# Patient Record
Sex: FEMALE | URBAN - METROPOLITAN AREA
[De-identification: names, ages, dates, MRNs, and addresses within clinical notes are randomized per-mention and may not be internally consistent; named-entity substitution may affect disease eponyms.]

---

## 2021-09-17 ENCOUNTER — OFFICE VISIT (OUTPATIENT)
Dept: URBAN - METROPOLITAN AREA CLINIC 6 | Facility: CLINIC | Age: 76
End: 2021-09-17
Payer: MEDICARE

## 2021-09-17 DIAGNOSIS — H04.123 TEAR FILM INSUFFICIENCY OF BILATERAL LACRIMAL GLANDS: Primary | ICD-10-CM

## 2021-09-17 PROCEDURE — 68761 CLOSE TEAR DUCT OPENING: CPT | Performed by: OPTOMETRIST

## 2021-09-17 PROCEDURE — 92012 INTRM OPH EXAM EST PATIENT: CPT | Performed by: OPTOMETRIST

## 2021-09-17 ASSESSMENT — INTRAOCULAR PRESSURE
OS: 11
OD: 13

## 2021-09-17 NOTE — IMPRESSION/PLAN
Impression: Tear film insufficiency of bilateral lacrimal glands: H04.123. Plan: Emergency visit for Dry eyes - not improving with use of topical treatment - reviewed the r/b/a of punctal plugs - placed Odyssey/BVI parasol plugs both inferior eyelids. Advised to contact us with any problems or concerns. Size: S Lot: [id number]. Continue ATs OU and start warm compresses and lid hygiene.

## 2023-05-04 ENCOUNTER — OFFICE VISIT (OUTPATIENT)
Dept: URBAN - METROPOLITAN AREA CLINIC 6 | Facility: CLINIC | Age: 78
End: 2023-05-04
Payer: MEDICARE

## 2023-05-04 DIAGNOSIS — H00.12 CHALAZION RIGHT LOWER LID: Primary | ICD-10-CM

## 2023-05-04 PROCEDURE — 92012 INTRM OPH EXAM EST PATIENT: CPT

## 2023-05-04 RX ORDER — ERYTHROMYCIN 5 MG/G
OINTMENT OPHTHALMIC
Qty: 5 | Refills: 0 | Status: ACTIVE
Start: 2023-05-04

## 2023-05-04 ASSESSMENT — INTRAOCULAR PRESSURE
OS: 12
OD: 10

## 2023-05-04 NOTE — IMPRESSION/PLAN
Impression: Emergency visit for Chalazion right lower lid: H00.12. Plan: -Start warm compresses TID x 10 minutes OD/OS, gentle eyelid massages and Erythromycin ointment BID OD. Continue Moxifloxacin TID as rx'd by ER.
-If does not improve, will consider referral for  either intralesional Kenalog injection or surgical excision in the office. 
-RTC in 6-8 weeks or sooner should visual or ocular symptoms worsen

## 2024-06-27 ENCOUNTER — APPOINTMENT (RX ONLY)
Dept: URBAN - METROPOLITAN AREA CLINIC 129 | Facility: CLINIC | Age: 79
Setting detail: DERMATOLOGY
End: 2024-06-27

## 2024-06-27 DIAGNOSIS — M71 OTHER BURSOPATHIES: ICD-10-CM

## 2024-06-27 PROBLEM — M71.342 OTHER BURSAL CYST, LEFT HAND: Status: ACTIVE | Noted: 2024-06-27

## 2024-06-27 PROCEDURE — ? COUNSELING

## 2024-06-27 PROCEDURE — 99202 OFFICE O/P NEW SF 15 MIN: CPT

## 2024-06-27 PROCEDURE — ? REFERRAL

## 2024-06-27 ASSESSMENT — LOCATION DETAILED DESCRIPTION DERM: LOCATION DETAILED: LEFT SMALL FINGER DISTAL INTERPHALANGEAL JOINT

## 2024-06-27 ASSESSMENT — LOCATION SIMPLE DESCRIPTION DERM: LOCATION SIMPLE: LEFT SMALL FINGER

## 2024-06-27 ASSESSMENT — LOCATION ZONE DERM: LOCATION ZONE: FINGER
